# Patient Record
Sex: FEMALE | Employment: UNEMPLOYED | URBAN - METROPOLITAN AREA
[De-identification: names, ages, dates, MRNs, and addresses within clinical notes are randomized per-mention and may not be internally consistent; named-entity substitution may affect disease eponyms.]

---

## 2017-02-06 ENCOUNTER — PATIENT MESSAGE (OUTPATIENT)
Dept: ENDOCRINOLOGY CLINIC | Facility: CLINIC | Age: 47
End: 2017-02-06

## 2017-02-06 NOTE — TELEPHONE ENCOUNTER
From: Mark Rodriguez  To: Ivanna Max MD  Sent: 2/6/2017 5:00 PM CST  Subject: Non-Urgent Medical Question    sorry for the messages, I wanted to add one thing. As I think back, I've actually had several times when I've started periods of shakiness.

## 2017-02-15 ENCOUNTER — PATIENT MESSAGE (OUTPATIENT)
Dept: ENDOCRINOLOGY CLINIC | Facility: CLINIC | Age: 47
End: 2017-02-15

## 2017-02-16 RX ORDER — LEVOTHYROXINE SODIUM 50 UG/1
1 CAPSULE ORAL
Qty: 12 CAPSULE | Refills: 0 | Status: SHIPPED | OUTPATIENT
Start: 2017-02-16 | End: 2017-03-01

## 2017-03-01 RX ORDER — LEVOTHYROXINE SODIUM 50 UG/1
1 CAPSULE ORAL
Qty: 12 CAPSULE | Refills: 0 | Status: SHIPPED | OUTPATIENT
Start: 2017-03-01 | End: 2017-03-31

## 2017-03-01 NOTE — TELEPHONE ENCOUNTER
LOV 11/7/16. Per last email encounter patient now taking Tirosint 50mcg once per week and 75mcg 5 days per week.

## 2017-03-01 NOTE — TELEPHONE ENCOUNTER
Current Outpatient Prescriptions:  Levothyroxine Sodium (TIROSINT) 50 MCG Oral Cap Take 1 capsule by mouth every 7 days.  Disp: 12 capsule Rfl: 0     Refill

## 2017-04-01 ENCOUNTER — PATIENT MESSAGE (OUTPATIENT)
Dept: ENDOCRINOLOGY CLINIC | Facility: CLINIC | Age: 47
End: 2017-04-01

## 2017-04-01 DIAGNOSIS — E03.9 HYPOTHYROIDISM, UNSPECIFIED TYPE: Primary | ICD-10-CM

## 2017-04-03 NOTE — TELEPHONE ENCOUNTER
From: José Michael  To: Krissy Simms MD  Sent: 4/1/2017 7:10 PM CDT  Subject: Non-Urgent Medical Question    Hi Dr. Miguel Marsh. So, I tried estrogen/testosterone, didn't seem to help for brain fog/out of it/tiredness.  Although the shakiness I was experi

## 2017-04-03 NOTE — TELEPHONE ENCOUNTER
Ok to try Naturethroid - the conversion is no always clear so will likely need to dose adjust.  Start Nature-throid 81.25mg and repeat TSH, FT4, FT3 in 6 weeks.

## 2017-05-04 ENCOUNTER — LAB ENCOUNTER (OUTPATIENT)
Dept: LAB | Age: 47
End: 2017-05-04
Attending: INTERNAL MEDICINE
Payer: COMMERCIAL

## 2017-05-04 DIAGNOSIS — Z00.00 ROUTINE GENERAL MEDICAL EXAMINATION AT A HEALTH CARE FACILITY: Primary | ICD-10-CM

## 2017-05-04 PROCEDURE — 85025 COMPLETE CBC W/AUTO DIFF WBC: CPT

## 2017-05-09 ENCOUNTER — PATIENT MESSAGE (OUTPATIENT)
Dept: ENDOCRINOLOGY CLINIC | Facility: CLINIC | Age: 47
End: 2017-05-09

## 2017-05-09 DIAGNOSIS — E03.9 HYPOTHYROIDISM, UNSPECIFIED TYPE: Primary | ICD-10-CM

## 2017-05-09 NOTE — TELEPHONE ENCOUNTER
Dr. Mendel Downy please see patient's email. She would like to change thyroid medications to her old regimen.

## 2017-05-09 NOTE — TELEPHONE ENCOUNTER
From: Maria Elena Rivera  To: Danita Adams MD  Sent: 5/9/2017 11:06 AM CDT  Subject: Prescription Question    Hi Dr. Caron Mccabe :) I apologize for going back and forth on this.  I really wanted to try the nature-throid as I had heard so many success stories fr

## 2017-05-10 NOTE — TELEPHONE ENCOUNTER
Ok to stop Nature-throid and go back to previous regimen of tirosint and cytomel, please keep cytomel just once daily in the morning.

## 2017-06-12 ENCOUNTER — PATIENT MESSAGE (OUTPATIENT)
Dept: ENDOCRINOLOGY CLINIC | Facility: CLINIC | Age: 47
End: 2017-06-12

## 2017-06-12 DIAGNOSIS — E03.9 ACQUIRED HYPOTHYROIDISM: Primary | ICD-10-CM

## 2017-06-12 NOTE — TELEPHONE ENCOUNTER
From: Mai Bahena  To: Marek Quezada MD  Sent: 6/12/2017 1:51 PM CDT  Subject: Non-Urgent Medical Question    Hi Dr. Funmilayo Gleason  It's been 4 weeks since I've been back on the tirosint and cytomel. I'm currently feeling really run down, tired, out of it.

## 2017-06-19 PROCEDURE — 82525 ASSAY OF COPPER: CPT | Performed by: INTERNAL MEDICINE

## 2017-06-19 PROCEDURE — 84255 ASSAY OF SELENIUM: CPT | Performed by: INTERNAL MEDICINE

## 2017-06-19 PROCEDURE — 84630 ASSAY OF ZINC: CPT | Performed by: INTERNAL MEDICINE

## 2017-06-22 RX ORDER — LEVOTHYROXINE SODIUM 75 UG/1
CAPSULE ORAL
Qty: 84 CAPSULE | Refills: 0 | Status: SHIPPED | OUTPATIENT
Start: 2017-06-22 | End: 2017-08-19

## 2017-06-22 NOTE — TELEPHONE ENCOUNTER
LOV 11/7/16 with RTC 3 months. No F/U scheduled. Patient has recently repeated thyroid labs. Does she need to be seen in clinic for results review and refill?

## 2017-08-14 RX ORDER — LIOTHYRONINE SODIUM 5 UG/1
TABLET ORAL
Qty: 90 TABLET | Refills: 0 | Status: SHIPPED | OUTPATIENT
Start: 2017-08-14

## 2017-08-21 RX ORDER — LEVOTHYROXINE SODIUM 75 UG/1
CAPSULE ORAL
Qty: 84 CAPSULE | Refills: 2 | Status: SHIPPED | OUTPATIENT
Start: 2017-08-21

## 2017-12-15 ENCOUNTER — HOSPITAL ENCOUNTER (OUTPATIENT)
Dept: CT IMAGING | Facility: HOSPITAL | Age: 47
Discharge: HOME | End: 2017-12-15
Attending: INTERNAL MEDICINE
Payer: COMMERCIAL

## 2017-12-15 DIAGNOSIS — N20.0 KIDNEY STONE: ICD-10-CM

## 2017-12-15 PROCEDURE — 74176 CT ABD & PELVIS W/O CONTRAST: CPT | Performed by: INTERNAL MEDICINE

## 2019-08-27 ENCOUNTER — APPOINTMENT (OUTPATIENT)
Dept: CT IMAGING | Facility: HOSPITAL | Age: 49
End: 2019-08-27
Attending: EMERGENCY MEDICINE
Payer: COMMERCIAL

## 2019-08-27 ENCOUNTER — HOSPITAL ENCOUNTER (EMERGENCY)
Facility: HOSPITAL | Age: 49
Discharge: HOME | End: 2019-08-27
Attending: EMERGENCY MEDICINE
Payer: COMMERCIAL

## 2019-08-27 VITALS
BODY MASS INDEX: 22.15 KG/M2 | WEIGHT: 125 LBS | HEART RATE: 65 BPM | OXYGEN SATURATION: 96 % | RESPIRATION RATE: 13 BRPM | SYSTOLIC BLOOD PRESSURE: 119 MMHG | HEIGHT: 63 IN | TEMPERATURE: 99 F | DIASTOLIC BLOOD PRESSURE: 74 MMHG

## 2019-08-27 DIAGNOSIS — R25.1 TREMOR: ICD-10-CM

## 2019-08-27 DIAGNOSIS — R53.1 WEAKNESS GENERALIZED: Primary | ICD-10-CM

## 2019-08-27 DIAGNOSIS — R51.9 HEADACHE DISORDER: ICD-10-CM

## 2019-08-27 LAB
ALBUMIN SERPL-MCNC: 3.7 G/DL (ref 3.4–5)
ALBUMIN/GLOB SERPL: 0.9 {RATIO} (ref 1–2)
ALP LIVER SERPL-CCNC: 55 U/L (ref 39–100)
ALT SERPL-CCNC: 22 U/L (ref 13–56)
ANION GAP SERPL CALC-SCNC: 6 MMOL/L (ref 0–18)
AST SERPL-CCNC: 15 U/L (ref 15–37)
BASOPHILS # BLD AUTO: 0.03 X10(3) UL (ref 0–0.2)
BASOPHILS NFR BLD AUTO: 0.8 %
BILIRUB SERPL-MCNC: 0.2 MG/DL (ref 0.1–2)
BILIRUB UR QL STRIP.AUTO: NEGATIVE
BUN BLD-MCNC: 13 MG/DL (ref 7–18)
BUN/CREAT SERPL: 21.7 (ref 10–20)
CALCIUM BLD-MCNC: 9.3 MG/DL (ref 8.5–10.1)
CHLORIDE SERPL-SCNC: 109 MMOL/L (ref 98–112)
CLARITY UR REFRACT.AUTO: CLEAR
CO2 SERPL-SCNC: 26 MMOL/L (ref 21–32)
COLOR UR AUTO: COLORLESS
CREAT BLD-MCNC: 0.6 MG/DL (ref 0.55–1.02)
DEPRECATED RDW RBC AUTO: 35.8 FL (ref 35.1–46.3)
EOSINOPHIL # BLD AUTO: 0.06 X10(3) UL (ref 0–0.7)
EOSINOPHIL NFR BLD AUTO: 1.6 %
ERYTHROCYTE [DISTWIDTH] IN BLOOD BY AUTOMATED COUNT: 11.4 % (ref 11–15)
GLOBULIN PLAS-MCNC: 4.2 G/DL (ref 2.8–4.4)
GLUCOSE BLD-MCNC: 96 MG/DL (ref 70–99)
GLUCOSE UR STRIP.AUTO-MCNC: NEGATIVE MG/DL
HCT VFR BLD AUTO: 43.4 % (ref 35–48)
HGB BLD-MCNC: 14.8 G/DL (ref 12–16)
IMM GRANULOCYTES # BLD AUTO: 0 X10(3) UL (ref 0–1)
IMM GRANULOCYTES NFR BLD: 0 %
KETONES UR STRIP.AUTO-MCNC: NEGATIVE MG/DL
LEUKOCYTE ESTERASE UR QL STRIP.AUTO: NEGATIVE
LYMPHOCYTES # BLD AUTO: 1.3 X10(3) UL (ref 1–4)
LYMPHOCYTES NFR BLD AUTO: 34 %
M PROTEIN MFR SERPL ELPH: 7.9 G/DL (ref 6.4–8.2)
MCH RBC QN AUTO: 29.5 PG (ref 26–34)
MCHC RBC AUTO-ENTMCNC: 34.1 G/DL (ref 31–37)
MCV RBC AUTO: 86.6 FL (ref 80–100)
MONOCYTES # BLD AUTO: 0.5 X10(3) UL (ref 0.1–1)
MONOCYTES NFR BLD AUTO: 13.1 %
NEUTROPHILS # BLD AUTO: 1.93 X10 (3) UL (ref 1.5–7.7)
NEUTROPHILS # BLD AUTO: 1.93 X10(3) UL (ref 1.5–7.7)
NEUTROPHILS NFR BLD AUTO: 50.5 %
NITRITE UR QL STRIP.AUTO: NEGATIVE
OSMOLALITY SERPL CALC.SUM OF ELEC: 292 MOSM/KG (ref 275–295)
PH UR STRIP.AUTO: 7 [PH] (ref 4.5–8)
PLATELET # BLD AUTO: 289 10(3)UL (ref 150–450)
POTASSIUM SERPL-SCNC: 4.3 MMOL/L (ref 3.5–5.1)
PROT UR STRIP.AUTO-MCNC: NEGATIVE MG/DL
RBC # BLD AUTO: 5.01 X10(6)UL (ref 3.8–5.3)
SODIUM SERPL-SCNC: 141 MMOL/L (ref 136–145)
SP GR UR STRIP.AUTO: <1.005 (ref 1–1.03)
T3FREE SERPL-MCNC: 4.44 PG/ML (ref 2.4–4.2)
T4 FREE SERPL-MCNC: 0.7 NG/DL (ref 0.8–1.7)
TSI SER-ACNC: <0.005 MIU/ML (ref 0.36–3.74)
UROBILINOGEN UR STRIP.AUTO-MCNC: <2 MG/DL
WBC # BLD AUTO: 3.8 X10(3) UL (ref 4–11)

## 2019-08-27 PROCEDURE — 84481 FREE ASSAY (FT-3): CPT | Performed by: EMERGENCY MEDICINE

## 2019-08-27 PROCEDURE — 99284 EMERGENCY DEPT VISIT MOD MDM: CPT

## 2019-08-27 PROCEDURE — 70450 CT HEAD/BRAIN W/O DYE: CPT | Performed by: EMERGENCY MEDICINE

## 2019-08-27 PROCEDURE — 85025 COMPLETE CBC W/AUTO DIFF WBC: CPT | Performed by: EMERGENCY MEDICINE

## 2019-08-27 PROCEDURE — 96360 HYDRATION IV INFUSION INIT: CPT

## 2019-08-27 PROCEDURE — 84443 ASSAY THYROID STIM HORMONE: CPT | Performed by: EMERGENCY MEDICINE

## 2019-08-27 PROCEDURE — 84439 ASSAY OF FREE THYROXINE: CPT | Performed by: EMERGENCY MEDICINE

## 2019-08-27 PROCEDURE — 81001 URINALYSIS AUTO W/SCOPE: CPT | Performed by: EMERGENCY MEDICINE

## 2019-08-27 PROCEDURE — 80053 COMPREHEN METABOLIC PANEL: CPT | Performed by: EMERGENCY MEDICINE

## 2019-08-27 NOTE — ED NOTES
Patient assisted up to bathroom, mildly unsteady gait, needing to hold onto someone or the wall. Tremor observed and shakiness. Alert and appropriate, following commands and answering questions appropriately.

## 2019-08-27 NOTE — ED PROVIDER NOTES
Patient Seen in: [facility] Emergency Department    History   Patient presents with:  Headache (neurologic)  Numbness Weakness (neurologic)    Stated Complaint: Tearful on arrival, sts she has been sick for 2 years and no one knows what is *    HPI has never seen a hematologist for this. She has also never seen a neurologist for these chronic headaches and tremors.     Past Medical History:   Diagnosis Date   • ANXIETY    • DEPRESSION    • HYPOTHYROIDISM    • Hypothyroidism               Past Surgica components within normal limits   TSH+FREE T4 - Abnormal; Notable for the following components:    Free T4 0.7 (*)     TSH <0.005 (*)     All other components within normal limits   URINALYSIS WITH CULTURE REFLEX - Abnormal; Notable for the following compo abnormal extraaxial fluid collections. There is no midline shift. There are no intraparenchymal brain abnormalities. There is nothing specific for acute infarct. There is no hemorrhage or mass lesion. SINUSES:           No sign of acute sinusitis.   MA

## 2019-08-27 NOTE — ED INITIAL ASSESSMENT (HPI)
Patient presents with headaches for the past 5 days. She reports that NSAIDS have helped some, but have not relieved pain.  She states that she has been sick for the past two years with unexplained tremors/weakness bilaterally and this has increased in the

## 2019-08-27 NOTE — ED NOTES
Report given to [facility] RN at this time. Patient has just come back from bathroom.  at bedside. Lab results pending.

## 2024-02-13 ENCOUNTER — OFFICE VISIT (OUTPATIENT)
Dept: RHEUMATOLOGY | Facility: CLINIC | Age: 54
End: 2024-02-13
Payer: COMMERCIAL

## 2024-02-13 VITALS
DIASTOLIC BLOOD PRESSURE: 77 MMHG | HEART RATE: 80 BPM | BODY MASS INDEX: 23.37 KG/M2 | SYSTOLIC BLOOD PRESSURE: 127 MMHG | HEIGHT: 62 IN | WEIGHT: 127 LBS

## 2024-02-13 DIAGNOSIS — G89.29 CHRONIC LOW BACK PAIN, UNSPECIFIED BACK PAIN LATERALITY, UNSPECIFIED WHETHER SCIATICA PRESENT: ICD-10-CM

## 2024-02-13 DIAGNOSIS — M54.50 CHRONIC LOW BACK PAIN, UNSPECIFIED BACK PAIN LATERALITY, UNSPECIFIED WHETHER SCIATICA PRESENT: ICD-10-CM

## 2024-02-13 DIAGNOSIS — R53.83 OTHER FATIGUE: ICD-10-CM

## 2024-02-13 DIAGNOSIS — R76.8 POSITIVE ANA (ANTINUCLEAR ANTIBODY): Primary | ICD-10-CM

## 2024-02-13 DIAGNOSIS — M79.10 MYALGIA: ICD-10-CM

## 2024-02-13 RX ORDER — HYDROXYCHLOROQUINE SULFATE 200 MG/1
200 TABLET, FILM COATED ORAL DAILY
Qty: 90 TABLET | Refills: 0 | Status: SHIPPED | OUTPATIENT
Start: 2024-02-13

## 2024-02-13 NOTE — PROGRESS NOTES
Yvette Hoyt is a 54 year old female who presents for   Chief Complaint   Patient presents with    Consult     Np Referred by Aditi for Positive Autoimmune Titer    .   HPI:     I had the pleasure of seeing Yvette Hoyt on 2/13/2024 for evaluation.     She is a pleasant 54 year old who has a positive RUTH . She was referred by Tamra Zapata NP at [facility].   She has hx of leukopenia. Wbc is 3.7 and chronic fatigue.   Seeing Tamra since 2017. She mainly has been seeing her for her thyroid tests.   She has early ag for EBV - that is present.   It's been 6-7 years of testing to see if she feels better.   She did see a rheumatologist I the past. B/c the ruth was positive and thought to be drug induced lupus. But then she was told it was false results.     She intermittently gets joint pains - . She never feels good but there are times she has more energy. There are days she can't get out of bed.   She feels like she is a little better.   She purchased a new used car. She just got so tired.   She has been this way since August.   The weakness and the fatigue     She had positive RUTH in 5/2023 and 8/2023 - positive - and postiive phsophatidyl serine is postiive - .     She has bene having chronic fatigue for 6-7 year .   Put on a strict diet twice - seh has lost weigh tduring that time -     Put on restasis bc her eyes have been progressively gotten worse with her dryenss.   In May 2023  she staretd having more burning . She saw opthalmolgoist  -   Her nose and sinuses are dry.   In her nose in the front - she has a sore or cracking. It hurts.   No oral ulcers - her mouth is dry - starting to have dental issue b/c of it.     About 4 months ago - one of her salivary glands enlarged.she thinks it was a salivary stone - b/c she dislodged it and it drained.   She feels worse in the sun - can get a rash on the back of her neck. Not on her cheeks.   No cervical adenopathy   - diffuse hair loss -    Gets sob - gets a dry cough and a gets a weird sensation in the middle of her chest.   No dysphagia  Gets on and off lower back pain -     Lymes in the past was negative   Got checked for mold as well.   - tried valtrex for EBV - and ivermectin  -     Wt Readings from Last 2 Encounters:   24 127 lb (57.6 kg)   19 125 lb (56.7 kg)     Body mass index is 23.23 kg/m².      Current Outpatient Medications   Medication Sig Dispense Refill    TIROSINT 75 MCG Oral Cap TAKE 1 CAPSULE DAILY 84 capsule 2    LIOTHYRONINE SODIUM 5 MCG Oral Tab TAKE 1 TABLET DAILY (Patient taking differently: Take 7 tablets (35 mcg total) by mouth daily.) 90 tablet 0    alprazolam 0.5 MG Oral Tab Take 1 tablet (0.5 mg total) by mouth nightly as needed for Anxiety.      escitalopram (LEXAPRO) 10 MG Oral Tab Take 2 tablets (20 mg total) by mouth once daily. 30 tablet 0      Past Medical History:   Diagnosis Date    ANXIETY     DEPRESSION     HYPOTHYROIDISM     Hypothyroidism       Past Surgical History:   Procedure Laterality Date            Family History   Problem Relation Age of Onset    Heart Disorder Father         Mi-in 40's, CABG    Other (ckd[other]) Father     Hypertension Father     Other ([other]) Mother         passed 75 from leukemia-lymphocytic      Social History:  Social History     Socioeconomic History    Marital status:    Occupational History    Occupation: homemaker   Tobacco Use    Smoking status: Never    Smokeless tobacco: Never   Substance and Sexual Activity    Alcohol use: No     Alcohol/week: 0.0 standard drinks of alcohol    Drug use: No   Other Topics Concern     Service No    Blood Transfusions No    Caffeine Concern No    Occupational Exposure No    Hobby Hazards No    Sleep Concern No    Stress Concern Yes    Weight Concern Yes     Comment: up and down a few lb depending on thyroid condition    Special Diet Yes     Comment: balanced    Exercise No     Comment: counseled as  inconsistent with exercise    Seat Belt Yes    Self-Exams Yes      Not working,   2 children -      REVIEW OF SYSTEMS:   Review Of Systems:  Fatigue  Constitutional:No fever, no change in weight or appetitie  Derm: No rashes, no oral ulcers, no alopecia, no photosensitivity, no psoriasis  HEENT: No dry eyes, no dry mouth, no Raynaud's, no nasal ulcers, no parotid swelling, no neck pain, no jaw pain, no temple pain  Eyes: No visual changes,   CVS: No chest pain, no heart disease  RS: gets  SOB, no Cough, No Pleurtic pain,   GI: No nausea, no vomiiting, no abominal pain, no hx of ulcer, no gastritis, no heartburn, no dyshpagia, no BRBPR or melena  : no dysuria, no hx of miscarriages, no DVT Hx, no hx of OCP,   2 children - no preeclampsia   Neuro: gets joint pain in her hands and feet - , most recently she had elbow down tingling, no headache, intermittent headaches, , no hx of seizures,   Psych: no hx of anxiety or depression  ENDO: no hx of thyroid disease, no hx of DM  Joint/Muscluskeltal: see HPI,   All other ROS are negative.     EXAM:   /77 (BP Location: Right arm, Patient Position: Sitting, Cuff Size: adult)   Pulse 80   Ht 5' 2\" (1.575 m)   Wt 127 lb (57.6 kg)   BMI 23.23 kg/m²   HEENT: Clear oropharynx, no oral ulcers, EOM intact, clear sclear, PERRLA, pleasant, no acute distress, no CAD,   No rashes  CVS: RRR, no murmurs  RS: CTAB, no crackles, no rhonchi  ABD: Soft Non tender, no HSM felt, BS positive  Joint exam:   no neck tendnerness, good ROM,   EXTREMITIES: no cyanosis, clubbing or edema  NEURO: intact touch, 5/5 ue and le strength    5/12/2023 -   Wbc is 3.7 hb is 14.1, , plt 302,   Cmp - cr is 0.59, ast is 14, lat is 11,   Esr is 6mm/hr.   Free T3 is 3.3, Free T4 is 0.8    5/31/2023 - [facility] -   MIGNON 28.96 -  (> 20 positive)   MIGNON 1:80   Rf 0.8, anti ccp is 2.0, anti cardiolipin neg, anti beta 2 gp negative,   Anti thryoidglobluin and anti tpo negativ,e   Anti rnp, ro52, ro 60, ssb, scl 70 ,  rna polyerase III, anti centromere, anti jo1 negative,   Anti mckeon , maria teresa ds dna negative,   Anti EC4d and BC4d negative.   Anti c1 q neg, anti ripbosomal p herman  Anti phsophatdiyl serine IG G is 65.88, anti caridolipin and anti b2 bp negative.       C3 is 140, c4 is 22.2  ASSESSMENT AND PLAN:   Yvette Hoyt is a 54 year old female who presents for   Chief Complaint   Patient presents with    Consult     Np Referred by Aditi for Positive Autoimmune Titer      Positive MIGNON 1:80 - chronic fatigue - joint pains, sicca sx -   Not meeting criteria for lupus - d/w her about this   - but can try on hydroxychlroquine 200mg a day - sometimes when patients are not quite hitting the creteriea we could still try using it  Discussed risks and benefits of medication with patient , including retinal toxicity risk and importance of regular monitoring on the medication.   = rtc in 3months.       Summary:  Check labs   Trial of hydroxychlroquine 200mg a day   Return to clinic in 3months.         Regis Olivia MD  2/13/2024  1:57 PM

## 2024-02-13 NOTE — PATIENT INSTRUCTIONS
Check labs   Trial of hydroxychlroquine 200mg a day   Return to clinic in 3months.   Consider medicine for dry eyes and dry mouth - like cevelimine

## 2024-03-13 ENCOUNTER — TELEMEDICINE (OUTPATIENT)
Dept: RHEUMATOLOGY | Facility: CLINIC | Age: 54
End: 2024-03-13
Payer: COMMERCIAL

## 2024-03-13 DIAGNOSIS — Z15.89 HLA B27 (HLA B27 POSITIVE): Primary | ICD-10-CM

## 2024-03-13 DIAGNOSIS — R53.83 OTHER FATIGUE: ICD-10-CM

## 2024-03-13 DIAGNOSIS — M54.50 CHRONIC LOW BACK PAIN, UNSPECIFIED BACK PAIN LATERALITY, UNSPECIFIED WHETHER SCIATICA PRESENT: ICD-10-CM

## 2024-03-13 DIAGNOSIS — G89.29 CHRONIC LOW BACK PAIN, UNSPECIFIED BACK PAIN LATERALITY, UNSPECIFIED WHETHER SCIATICA PRESENT: ICD-10-CM

## 2024-03-13 DIAGNOSIS — R76.8 POSITIVE ANA (ANTINUCLEAR ANTIBODY): ICD-10-CM

## 2024-03-13 PROCEDURE — 99214 OFFICE O/P EST MOD 30 MIN: CPT | Performed by: INTERNAL MEDICINE

## 2024-03-13 NOTE — PATIENT INSTRUCTIONS
Check si joint xray and check labs - when she has flare up   Trial of hydroxychlroquine 200mg a day 0 can be done in the future   Return to clinic in 3months.   Info on spondylitis and reactive arthritis -   Consider mri si joint after xray is done

## 2024-03-13 NOTE — PROGRESS NOTES
Yvette Hoyt is a 54 year old female who presents for   No chief complaint on file.  .   HPI:     I had the pleasure of seeing Yvette Hoyt on 2/13/2024 for evaluation.     She is a pleasant 54 year old who has a positive RUTH . She was referred by Tamra Zapata NP at [facility].   She has hx of leukopenia. Wbc is 3.7 and chronic fatigue.   Seeing Tamra since 2017. She mainly has been seeing her for her thyroid tests.   She has early ag for EBV - that is present.   It's been 6-7 years of testing to see if she feels better.   She did see a rheumatologist I the past. B/c the ruth was positive and thought to be drug induced lupus. But then she was told it was false results.     She intermittently gets joint pains - . She never feels good but there are times she has more energy. There are days she can't get out of bed.   She feels like she is a little better.   She purchased a new used car. She just got so tired.   She has been this way since August.   The weakness and the fatigue     She had positive RUTH in 5/2023 and 8/2023 - positive - and postiive phsophatidyl serine is postiive - .     She has bene having chronic fatigue for 6-7 year .   Put on a strict diet twice - seh has lost weigh tduring that time -     Put on restasis bc her eyes have been progressively gotten worse with her dryenss.   In May 2023  she staretd having more burning . She saw opthalmolgoist  -   Her nose and sinuses are dry.   In her nose in the front - she has a sore or cracking. It hurts.   No oral ulcers - her mouth is dry - starting to have dental issue b/c of it.     About 4 months ago - one of her salivary glands enlarged.she thinks it was a salivary stone - b/c she dislodged it and it drained.   She feels worse in the sun - can get a rash on the back of her neck. Not on her cheeks.   No cervical adenopathy   - diffuse hair loss -   Gets sob - gets a dry cough and a gets a weird sensation in the middle of her chest.    No dysphagia  Gets on and off lower back pain -     Lymes in the past was negative   Got checked for mold as well.   - tried valtrex for EBV - and ivermectin  -     3/13/2024  VIDEO VISIT  HLAB27 positive -   She hasn't tried hydroxychlrqouine b/c she has felt a lot of medications caused her to be more sick.   She has alto of issues with her son and so she doesn't want to risk losing energy or having side effects on plaquenil right now.   Her son is not going to school b/c of mold issues.     If she had joitn pain in her hands, feet , back and neck pain - it's been during a flare of something -   She's fatigued all the time . She feels overnight she suddenly can't get up - and super fatigued. And has pain in her hands, feet and lower back pain.   The joint pains are definitley more in the morning and hurt less as the day goes on.     She has no pain right now.   She did have esr and crp and they are normal.   MIGNON negative   ESR is 25mm/hr.   CRP is 0.8mg/dL. - 2024 -       Wt Readings from Last 2 Encounters:   24 127 lb (57.6 kg)   19 125 lb (56.7 kg)     There is no height or weight on file to calculate BMI.      Current Outpatient Medications   Medication Sig Dispense Refill    hydroxychloroquine 200 MG Oral Tab Take 1 tablet (200 mg total) by mouth daily. 90 tablet 0    TIROSINT 75 MCG Oral Cap TAKE 1 CAPSULE DAILY 84 capsule 2    LIOTHYRONINE SODIUM 5 MCG Oral Tab TAKE 1 TABLET DAILY (Patient taking differently: Take 7 tablets (35 mcg total) by mouth daily.) 90 tablet 0    alprazolam 0.5 MG Oral Tab Take 1 tablet (0.5 mg total) by mouth nightly as needed for Anxiety.      escitalopram (LEXAPRO) 10 MG Oral Tab Take 2 tablets (20 mg total) by mouth once daily. 30 tablet 0      Past Medical History:   Diagnosis Date    ANXIETY     DEPRESSION     HYPOTHYROIDISM     Hypothyroidism       Past Surgical History:   Procedure Laterality Date            Family History   Problem Relation Age of Onset     Heart Disorder Father         Mi-in 40's, CABG    Other (ckd[other]) Father     Hypertension Father     Other ([other]) Mother         passed 75 from leukemia-lymphocytic      Social History:  Social History     Socioeconomic History    Marital status:    Occupational History    Occupation: homemaker   Tobacco Use    Smoking status: Never    Smokeless tobacco: Never   Substance and Sexual Activity    Alcohol use: No     Alcohol/week: 0.0 standard drinks of alcohol    Drug use: No   Other Topics Concern     Service No    Blood Transfusions No    Caffeine Concern No    Occupational Exposure No    Hobby Hazards No    Sleep Concern No    Stress Concern Yes    Weight Concern Yes     Comment: up and down a few lb depending on thyroid condition    Special Diet Yes     Comment: balanced    Exercise No     Comment: counseled as inconsistent with exercise    Seat Belt Yes    Self-Exams Yes      Not working,   2 children -      REVIEW OF SYSTEMS:   Review Of Systems:  Fatigue  Constitutional:No fever, no change in weight or appetitie  Derm: No rashes, no oral ulcers, no alopecia, no photosensitivity, no psoriasis  HEENT: No dry eyes, no dry mouth, no Raynaud's, no nasal ulcers, no parotid swelling, no neck pain, no jaw pain, no temple pain  Eyes: No visual changes,   CVS: No chest pain, no heart disease  RS: gets  SOB, no Cough, No Pleurtic pain,   GI: No nausea, no vomiiting, no abominal pain, no hx of ulcer, no gastritis, no heartburn, no dyshpagia, no BRBPR or melena  : no dysuria, no hx of miscarriages, no DVT Hx, no hx of OCP,   2 children - no preeclampsia   Neuro: gets joint pain in her hands and feet - , most recently she had elbow down tingling, no headache, intermittent headaches, , no hx of seizures,   Psych: no hx of anxiety or depression  ENDO: no hx of thyroid disease, no hx of DM  Joint/Muscluskeltal: see HPI,   All other ROS are negative.     EXAM:   There were no vitals taken for this  visit.  HEENT: Clear oropharynx, no oral ulcers, EOM intact, clear sclear, PERRLA, pleasant, no acute distress, no CAD,   No rashes  CVS: RRR, no murmurs  RS: CTAB, no crackles, no rhonchi  ABD: Soft Non tender, no HSM felt, BS positive  Joint exam:   no neck tendnerness, good ROM,   EXTREMITIES: no cyanosis, clubbing or edema  NEURO: intact touch, 5/5 ue and le strength    5/12/2023 -   Wbc is 3.7 hb is 14.1, , plt 302,   Cmp - cr is 0.59, ast is 14, lat is 11,   Esr is 6mm/hr.   Free T3 is 3.3, Free T4 is 0.8    5/31/2023 - [facility] -   MIGNON 28.96 -  (> 20 positive)   MIGNON 1:80   Rf 0.8, anti ccp is 2.0, anti cardiolipin neg, anti beta 2 gp negative,   Anti thryoidglobluin and anti tpo negativ,e   Anti rnp, ro52, ro 60, ssb, scl 70 , rna polyerase III, anti centromere, anti jo1 negative,   Anti mckeon , maria teresa ds dna negative,   Anti EC4d and BC4d negative.   Anti c1 q neg, anti ripbosomal p herman  Anti phsophatdiyl serine IG G is 65.88, anti caridolipin and anti b2 bp negative.       C3 is 140, c4 is 22.2    2/19/2024 -   HLAB27 positive   Aldolase 2.8  Ace is 27,   Ck is 31,     ASSESSMENT AND PLAN:   Yvette Hoyt is a 54 year old female who presents for   No chief complaint on file.    HLAB27 -positive - , Positive MIGNON 1:80 - chronic fatigue - joint pains, sicca sx -   Not meeting criteria for lupus - d/w her about this   But potentially she is getting flare with spondylitis -   Check si joints xray and labs when seh gets a flare up   - but can try on hydroxychlroquine 200mg a day - sometimes when patients are not quite hitting the creteriea we could still try using it  Discussed risks and benefits of medication with patient , including retinal toxicity risk and importance of regular monitoring on the medication.   = rtc in 3months.     2. Leukopenia -     Summary:  Check si joint xray and check labs - when she has flare up   Trial of hydroxychlroquine 200mg a day 0 can be done in the future   Return to clinic in  3months.   Info on spondylitis and reactive arthritis -       Regis Olivia MD  3/13/2024   2:22 PM

## 2025-04-04 NOTE — ED NOTES
A (CATHETER PA 110CM 6FR SWAN-DYAN 4 LUM TD LTX) catheter was inserted. Care assumed. Bedside report received.

## (undated) NOTE — ED AVS SNAPSHOT
Annmarie Dm   MRN: [id number]    Department:  [facility] Emergency Department   Date of Visit:  8/27/2019           Disclosure     Insurance plans vary and the physician(s) referred by the ER may not be covered by your plan.  Please contact tell this physician (or your personal doctor if your instructions are to return to your personal doctor) about any new or lasting problems. The primary care or specialist physician will see patients referred from the [facility] Emergency Department.  Iman Pond